# Patient Record
Sex: FEMALE
[De-identification: names, ages, dates, MRNs, and addresses within clinical notes are randomized per-mention and may not be internally consistent; named-entity substitution may affect disease eponyms.]

---

## 2022-08-05 PROBLEM — Z00.00 ENCOUNTER FOR PREVENTIVE HEALTH EXAMINATION: Status: ACTIVE | Noted: 2022-08-05

## 2022-08-08 ENCOUNTER — APPOINTMENT (OUTPATIENT)
Dept: ORTHOPEDIC SURGERY | Facility: CLINIC | Age: 47
End: 2022-08-08
Payer: COMMERCIAL

## 2022-08-08 VITALS — BODY MASS INDEX: 23.18 KG/M2 | HEIGHT: 59 IN | WEIGHT: 115 LBS

## 2022-08-08 DIAGNOSIS — M17.11 UNILATERAL PRIMARY OSTEOARTHRITIS, RIGHT KNEE: ICD-10-CM

## 2022-08-08 DIAGNOSIS — J45.909 UNSPECIFIED ASTHMA, UNCOMPLICATED: ICD-10-CM

## 2022-08-08 DIAGNOSIS — M17.12 UNILATERAL PRIMARY OSTEOARTHRITIS, LEFT KNEE: ICD-10-CM

## 2022-08-08 PROCEDURE — 73564 X-RAY EXAM KNEE 4 OR MORE: CPT | Mod: 50

## 2022-08-08 PROCEDURE — 99203 OFFICE O/P NEW LOW 30 MIN: CPT

## 2022-08-08 RX ORDER — NAPROXEN 500 MG/1
500 TABLET ORAL
Qty: 60 | Refills: 0 | Status: ACTIVE | COMMUNITY
Start: 2022-08-08 | End: 1900-01-01

## 2022-08-08 NOTE — HISTORY OF PRESENT ILLNESS
[6] : 6 [2] : 2 [Dull/Aching] : dull/aching [Intermittent] : intermittent [Rest] : rest [de-identified] : 8/8/22: Patient is a 48 yo female c/o bilateral knee pain for 2 months with no specific injury. Pain is worse with activity. No n/t. Using voltaren gel which gives some relief. No previous injuries or surgeries to bilateral knees.\par Occupation: Teacher  [] : no [FreeTextEntry1] : kathy knees  [FreeTextEntry5] :  OSCAR PRADHAN is a 47 year female who is here today for kathy knee pain. She has been having pain since 2018. She was seen at our [place] office and did PT, the pain resided for awhile and restarted a few months ago.  [de-identified] : activities  [de-identified] : 2018 [de-identified] : 2018 [de-identified] : xray

## 2022-08-08 NOTE — ASSESSMENT
[FreeTextEntry1] : Xrays reviewed with patient - moderate pf oa\par Treatment options discussed\par Recommend course of PT/HEP\par Naproxen sent for pain and inflammation\par If pain persists, may consider injection\par Follow up in 4-6 weeks

## 2022-08-08 NOTE — PHYSICAL EXAM
[Bilateral] : knee bilaterally [Degenerative change] : Degenerative change [Right] : right knee [Left] : left knee [NL (0)] : extension 0 degrees [5___] : hamstring 5[unfilled]/5 [Negative] : negative Petrona's [Moderate patellofemoral OA] : Moderate patellofemoral OA [] : non-antalgic [TWNoteComboBox7] : flexion 135 degrees

## 2022-09-20 ENCOUNTER — APPOINTMENT (OUTPATIENT)
Dept: ORTHOPEDIC SURGERY | Facility: CLINIC | Age: 47
End: 2022-09-20